# Patient Record
Sex: FEMALE | Race: WHITE | Employment: PART TIME | URBAN - METROPOLITAN AREA
[De-identification: names, ages, dates, MRNs, and addresses within clinical notes are randomized per-mention and may not be internally consistent; named-entity substitution may affect disease eponyms.]

---

## 2019-03-06 ENCOUNTER — HOSPITAL ENCOUNTER (EMERGENCY)
Age: 24
Discharge: HOME | End: 2019-03-06
Attending: EMERGENCY MEDICINE
Payer: COMMERCIAL

## 2019-03-06 VITALS
BODY MASS INDEX: 42.52 KG/M2 | DIASTOLIC BLOOD PRESSURE: 68 MMHG | WEIGHT: 240 LBS | RESPIRATION RATE: 18 BRPM | TEMPERATURE: 98.8 F | HEIGHT: 63 IN | OXYGEN SATURATION: 98 % | SYSTOLIC BLOOD PRESSURE: 117 MMHG | HEART RATE: 88 BPM

## 2019-03-06 DIAGNOSIS — J06.9 VIRAL UPPER RESPIRATORY TRACT INFECTION: Primary | ICD-10-CM

## 2019-03-06 LAB
INFLUENZA A BY PCR: NOT DETECTED
INFLUENZA B BY PCR: NOT DETECTED
STREP GRP A PCR: NEGATIVE

## 2019-03-06 PROCEDURE — 6370000000 HC RX 637 (ALT 250 FOR IP): Performed by: PHYSICIAN ASSISTANT

## 2019-03-06 PROCEDURE — 87880 STREP A ASSAY W/OPTIC: CPT

## 2019-03-06 PROCEDURE — 99283 EMERGENCY DEPT VISIT LOW MDM: CPT

## 2019-03-06 PROCEDURE — 87502 INFLUENZA DNA AMP PROBE: CPT

## 2019-03-06 RX ORDER — ALBUTEROL SULFATE 90 UG/1
2 AEROSOL, METERED RESPIRATORY (INHALATION) EVERY 4 HOURS PRN
Qty: 1 INHALER | Refills: 1 | Status: SHIPPED | OUTPATIENT
Start: 2019-03-06 | End: 2020-06-23 | Stop reason: ALTCHOICE

## 2019-03-06 RX ORDER — BENZONATATE 100 MG/1
100 CAPSULE ORAL 3 TIMES DAILY PRN
Qty: 21 CAPSULE | Refills: 0 | Status: SHIPPED | OUTPATIENT
Start: 2019-03-06 | End: 2019-03-13

## 2019-03-06 RX ORDER — GUAIFENESIN 600 MG/1
600 TABLET, EXTENDED RELEASE ORAL 2 TIMES DAILY
Qty: 30 TABLET | Refills: 0 | Status: SHIPPED | OUTPATIENT
Start: 2019-03-06 | End: 2019-03-21

## 2019-03-06 RX ORDER — IBUPROFEN 600 MG/1
600 TABLET ORAL EVERY 6 HOURS PRN
Qty: 120 TABLET | Refills: 0 | Status: SHIPPED | OUTPATIENT
Start: 2019-03-06 | End: 2020-06-23 | Stop reason: ALTCHOICE

## 2019-03-06 RX ORDER — IBUPROFEN 800 MG/1
800 TABLET ORAL ONCE
Status: COMPLETED | OUTPATIENT
Start: 2019-03-06 | End: 2019-03-06

## 2019-03-06 RX ADMIN — IBUPROFEN 800 MG: 800 TABLET, FILM COATED ORAL at 21:09

## 2019-03-06 ASSESSMENT — PAIN SCALES - GENERAL
PAINLEVEL_OUTOF10: 6
PAINLEVEL_OUTOF10: 5

## 2019-03-06 ASSESSMENT — PAIN DESCRIPTION - DESCRIPTORS: DESCRIPTORS: ACHING

## 2019-03-06 ASSESSMENT — PAIN DESCRIPTION - FREQUENCY: FREQUENCY: CONTINUOUS

## 2019-03-06 ASSESSMENT — PAIN DESCRIPTION - LOCATION: LOCATION: HEAD

## 2020-11-16 ENCOUNTER — HOSPITAL ENCOUNTER (OUTPATIENT)
Age: 25
Discharge: HOME | End: 2020-11-16
Attending: OBSTETRICS & GYNECOLOGY | Admitting: OBSTETRICS & GYNECOLOGY
Payer: COMMERCIAL

## 2020-11-16 VITALS
HEART RATE: 104 BPM | TEMPERATURE: 98 F | RESPIRATION RATE: 16 BRPM | SYSTOLIC BLOOD PRESSURE: 124 MMHG | DIASTOLIC BLOOD PRESSURE: 71 MMHG

## 2020-11-16 PROBLEM — R10.9 ABDOMINAL CRAMPING AFFECTING PREGNANCY: Status: ACTIVE | Noted: 2020-11-16

## 2020-11-16 PROBLEM — O26.899 ABDOMINAL CRAMPING AFFECTING PREGNANCY: Status: ACTIVE | Noted: 2020-11-16

## 2020-11-16 PROCEDURE — 99215 OFFICE O/P EST HI 40 MIN: CPT | Performed by: MIDWIFE

## 2020-11-16 PROCEDURE — 99211 OFF/OP EST MAY X REQ PHY/QHP: CPT

## 2020-11-16 ASSESSMENT — PAIN DESCRIPTION - LOCATION: LOCATION: BACK;PELVIS

## 2020-11-16 ASSESSMENT — PAIN - FUNCTIONAL ASSESSMENT: PAIN_FUNCTIONAL_ASSESSMENT: ACTIVITIES ARE NOT PREVENTED

## 2020-11-16 ASSESSMENT — PAIN DESCRIPTION - PAIN TYPE: TYPE: ACUTE PAIN

## 2020-11-16 ASSESSMENT — PAIN DESCRIPTION - PROGRESSION: CLINICAL_PROGRESSION: GRADUALLY WORSENING

## 2020-11-16 ASSESSMENT — PAIN DESCRIPTION - FREQUENCY: FREQUENCY: INTERMITTENT

## 2020-11-16 ASSESSMENT — PAIN DESCRIPTION - ONSET: ONSET: GRADUAL

## 2020-11-16 ASSESSMENT — PAIN DESCRIPTION - ORIENTATION: ORIENTATION: LOWER

## 2020-11-16 ASSESSMENT — PAIN DESCRIPTION - DESCRIPTORS: DESCRIPTORS: CRAMPING;DISCOMFORT

## 2020-11-16 ASSESSMENT — PAIN SCALES - GENERAL: PAINLEVEL_OUTOF10: 6

## 2020-11-16 NOTE — PROGRESS NOTES
After 1 hour of observing patient and rechecking cervix, pt remains unchanged. Closed/50/-2. Dr. Keri Johnson states to discharge patient home at this time.

## 2020-11-16 NOTE — H&P
Department of Obstetrics and Gynecology  Nurse Practitioner Obstetrics History and Physical        CHIEF COMPLAINT:  cramping    HISTORY OF PRESENT ILLNESS:  Lorraine Butler is a 22 y.o. female , Patient's last menstrual period was 2020.,  at 39w3d. Presents to L&D with cramping and lower back pain that intensified around 2pm today. Talking through contraction. Denies bleeding or LOF, reports good FM. States pregnancy uncomplicated, history of 1 previous vaginal birth        OB History        2    Para   1    Term   1            AB        Living   1       SAB        TAB        Ectopic        Molar        Multiple        Live Births   1                Estimated Due Date: Estimated Date of Delivery: 20      Pregnancy complicated by:   Patient Active Problem List   Diagnosis Code    Obesity complicating pregnancy G77.586    Mental disorder affecting pregnancy in second trimester O99.342    Attention deficit hyperactivity disorder (ADHD) F90.9    Anxiety F41.9    Depression F32.9    Drug fetal damage suspected in pregnancy, antepartum O35. 5XX0    Mental disorder complicating pregnancy T41.350    Maternal morbid obesity in third trimester, antepartum ([address] O99.213, E66.01           PAST OB HISTORY  OB History        2    Para   1    Term   1            AB        Living   1       SAB        TAB        Ectopic        Molar        Multiple        Live Births   1                  Past Medical History:          Diagnosis Date    ADD (attention deficit disorder)     Anxiety     Attention deficit hyperactivity disorder (ADHD) 2016    Depression        Past Surgical History:          Procedure Laterality Date    LAPAROSCOPY      NASAL POLYP SURGERY      TONSILLECTOMY AND ADENOIDECTOMY         Social History:    TOBACCO:   reports that she has been smoking. She has a 3.50 pack-year smoking history.  She has never used smokeless tobacco.  ETOH:   reports no history of alcohol use. DRUGS:   reports no history of drug use. Family History:   No family history on file. Medications Prior to Admission:  Medications Prior to Admission: albuterol sulfate  (90 Base) MCG/ACT inhaler, INHALE 2 PUFFS BY MOUTH EVERY 4 TO 6 HOURS AS NEEDED  Prenatal Vit-Fe Fumarate-FA (PRENATAL PLUS) 27-1 MG TABS, Take 1 tablet by mouth daily    Allergies:  Adhesive tape and Clindamycin/lincomycin      REVIEW OF SYSTEMS:          CONSTITUTIONAL :      No fever, no chills   HEENT :                         Headache absent,   visual disturbances absent  CARDIOVASCULAR :    No chest pain, no palpitations, no edema   RESPIRATORY :            No pain, no shortness of breath   GASTROINTESTINAL : No N/V, no D/C,    abdominal pain absent   GENITOURINARY :      Dysuria   absent,   hematuria absent,   urinary frequency absent  MUSCULOSKELETAL:  No myalgia,   back pain absent  NEUROLOGICAL :        No migraine, no seizures. Pertinent positives and negatives addressed in HPI, other systems reviewed and negative      PHYSICAL EXAM:    /71   Pulse 104   Temp 98 °F (36.7 °C) (Oral)   Resp 16   LMP 2020     General appearance:  awake, alert, cooperative, no apparent distress, and appears stated age  Neurologic:  Awake, alert, oriented to name, place and time.   Ambulatory to unit      Lungs:  Respirations easy and unlabored    Abdomen:   soft, gravid, non-tender,   CVA tenderness NA   Fetal position unable to assess due to maternal body habitus   EFW AGA  Fetal heart rate:  Baseline Heart Rate 135   Variability moderate   Accelerations Present   Decelerations absent  Pelvis:  External Genitalia: Lesions absent  SSE:  NA  Cervix:    DILATION:  Closed  EFFACEMENT:  50%  STATION:  -2  POSITION: posterior  CONSISTENCY:  Medium    Contractions:  one  Membranes:  intact      GBS: negative      Impression:  22 y.o.  39w3d cramping and back pain, GBS negative, category I tracing    Discussed with Dr. Deanna Chen in 1 hour, discharge to home if no cervical change and category I tracing        Electronically signed by ANJELIAC Wang CNM on 11/16/2020 at 5:16 PM

## 2020-11-16 NOTE — PROGRESS NOTES
, 39w3d presents to L&D stating that she thinks that she is in labor. Patient states she thinks that she may be having contractions but is unsure because she didn't feel contractions with her previous pregnancy. Pt denies decreased fetal movement,LOF, VB. EFM applied.

## 2020-11-16 NOTE — PROGRESS NOTES
Pt given written and verbal discharge instructions. Patient verbally understands instructions and is leaving ambulatory at this time.

## 2020-11-20 ENCOUNTER — APPOINTMENT (OUTPATIENT)
Dept: LABOR AND DELIVERY | Age: 25
DRG: 560 | End: 2020-11-20
Payer: COMMERCIAL

## 2020-11-20 ENCOUNTER — HOSPITAL ENCOUNTER (INPATIENT)
Age: 25
LOS: 3 days | Discharge: HOME | DRG: 560 | End: 2020-11-23
Attending: OBSTETRICS & GYNECOLOGY | Admitting: OBSTETRICS & GYNECOLOGY
Payer: COMMERCIAL

## 2020-11-20 PROBLEM — Z3A.40 40 WEEKS GESTATION OF PREGNANCY: Status: ACTIVE | Noted: 2020-11-20

## 2020-11-20 LAB
ABO/RH: NORMAL
AMPHETAMINE SCREEN, URINE: NOT DETECTED
ANTIBODY SCREEN: NORMAL
BARBITURATE SCREEN URINE: NOT DETECTED
BENZODIAZEPINE SCREEN, URINE: NOT DETECTED
CANNABINOID SCREEN URINE: NOT DETECTED
COCAINE METABOLITE SCREEN URINE: NOT DETECTED
FENTANYL SCREEN, URINE: NOT DETECTED
HCT VFR BLD CALC: 37 % (ref 34–48)
HEMOGLOBIN: 12.2 G/DL (ref 11.5–15.5)
Lab: NORMAL
MCH RBC QN AUTO: 29.5 PG (ref 26–35)
MCHC RBC AUTO-ENTMCNC: 33 % (ref 32–34.5)
MCV RBC AUTO: 89.6 FL (ref 80–99.9)
METHADONE SCREEN, URINE: NOT DETECTED
OPIATE SCREEN URINE: NOT DETECTED
OXYCODONE URINE: NOT DETECTED
PDW BLD-RTO: 14 FL (ref 11.5–15)
PHENCYCLIDINE SCREEN URINE: NOT DETECTED
PLATELET # BLD: 195 E9/L (ref 130–450)
PMV BLD AUTO: 9.8 FL (ref 7–12)
RBC # BLD: 4.13 E12/L (ref 3.5–5.5)
WBC # BLD: 9.1 E9/L (ref 4.5–11.5)

## 2020-11-20 PROCEDURE — 86900 BLOOD TYPING SEROLOGIC ABO: CPT

## 2020-11-20 PROCEDURE — 36415 COLL VENOUS BLD VENIPUNCTURE: CPT

## 2020-11-20 PROCEDURE — 1220000001 HC SEMI PRIVATE L&D R&B

## 2020-11-20 PROCEDURE — 85027 COMPLETE CBC AUTOMATED: CPT

## 2020-11-20 PROCEDURE — 86901 BLOOD TYPING SEROLOGIC RH(D): CPT

## 2020-11-20 PROCEDURE — 80307 DRUG TEST PRSMV CHEM ANLYZR: CPT

## 2020-11-20 PROCEDURE — 6370000000 HC RX 637 (ALT 250 FOR IP): Performed by: OBSTETRICS & GYNECOLOGY

## 2020-11-20 PROCEDURE — 86850 RBC ANTIBODY SCREEN: CPT

## 2020-11-20 RX ORDER — ONDANSETRON 2 MG/ML
4 INJECTION INTRAMUSCULAR; INTRAVENOUS EVERY 6 HOURS PRN
Status: DISCONTINUED | OUTPATIENT
Start: 2020-11-20 | End: 2020-11-21

## 2020-11-20 RX ORDER — DOCUSATE SODIUM 100 MG/1
100 CAPSULE, LIQUID FILLED ORAL 2 TIMES DAILY
Status: DISCONTINUED | OUTPATIENT
Start: 2020-11-20 | End: 2020-11-21

## 2020-11-20 RX ORDER — SODIUM CHLORIDE, SODIUM LACTATE, POTASSIUM CHLORIDE, CALCIUM CHLORIDE 600; 310; 30; 20 MG/100ML; MG/100ML; MG/100ML; MG/100ML
INJECTION, SOLUTION INTRAVENOUS CONTINUOUS
Status: DISCONTINUED | OUTPATIENT
Start: 2020-11-20 | End: 2020-11-21

## 2020-11-20 RX ORDER — CALCIUM CARBONATE 200(500)MG
1 TABLET,CHEWABLE ORAL DAILY
Status: ON HOLD | COMMUNITY
End: 2020-11-23 | Stop reason: HOSPADM

## 2020-11-20 RX ADMIN — Medication 25 MCG: at 22:44

## 2020-11-21 ENCOUNTER — ANESTHESIA (OUTPATIENT)
Dept: LABOR AND DELIVERY | Age: 25
DRG: 560 | End: 2020-11-21
Payer: COMMERCIAL

## 2020-11-21 ENCOUNTER — ANESTHESIA EVENT (OUTPATIENT)
Dept: LABOR AND DELIVERY | Age: 25
DRG: 560 | End: 2020-11-21
Payer: COMMERCIAL

## 2020-11-21 PROCEDURE — 6360000002 HC RX W HCPCS: Performed by: ANESTHESIOLOGY

## 2020-11-21 PROCEDURE — 51701 INSERT BLADDER CATHETER: CPT

## 2020-11-21 PROCEDURE — 6370000000 HC RX 637 (ALT 250 FOR IP): Performed by: OBSTETRICS & GYNECOLOGY

## 2020-11-21 PROCEDURE — 1220000000 HC SEMI PRIVATE OB R&B

## 2020-11-21 PROCEDURE — 2500000003 HC RX 250 WO HCPCS: Performed by: NURSE ANESTHETIST, CERTIFIED REGISTERED

## 2020-11-21 PROCEDURE — 2500000003 HC RX 250 WO HCPCS: Performed by: ANESTHESIOLOGY

## 2020-11-21 PROCEDURE — 6360000002 HC RX W HCPCS: Performed by: OBSTETRICS & GYNECOLOGY

## 2020-11-21 PROCEDURE — 7200000001 HC VAGINAL DELIVERY

## 2020-11-21 PROCEDURE — 2580000003 HC RX 258: Performed by: OBSTETRICS & GYNECOLOGY

## 2020-11-21 PROCEDURE — 3E0P7VZ INTRODUCTION OF HORMONE INTO FEMALE REPRODUCTIVE, VIA NATURAL OR ARTIFICIAL OPENING: ICD-10-PCS | Performed by: OBSTETRICS & GYNECOLOGY

## 2020-11-21 RX ORDER — NALOXONE HYDROCHLORIDE 0.4 MG/ML
0.4 INJECTION, SOLUTION INTRAMUSCULAR; INTRAVENOUS; SUBCUTANEOUS PRN
Status: DISCONTINUED | OUTPATIENT
Start: 2020-11-21 | End: 2020-11-21

## 2020-11-21 RX ORDER — SODIUM CHLORIDE 0.9 % (FLUSH) 0.9 %
10 SYRINGE (ML) INJECTION PRN
Status: DISCONTINUED | OUTPATIENT
Start: 2020-11-21 | End: 2020-11-23 | Stop reason: HOSPADM

## 2020-11-21 RX ORDER — ACETAMINOPHEN 650 MG
TABLET, EXTENDED RELEASE ORAL
Status: DISCONTINUED
Start: 2020-11-21 | End: 2020-11-21

## 2020-11-21 RX ORDER — SODIUM CHLORIDE 0.9 % (FLUSH) 0.9 %
10 SYRINGE (ML) INJECTION EVERY 12 HOURS SCHEDULED
Status: DISCONTINUED | OUTPATIENT
Start: 2020-11-21 | End: 2020-11-23 | Stop reason: HOSPADM

## 2020-11-21 RX ORDER — SODIUM CHLORIDE, SODIUM LACTATE, POTASSIUM CHLORIDE, CALCIUM CHLORIDE 600; 310; 30; 20 MG/100ML; MG/100ML; MG/100ML; MG/100ML
INJECTION, SOLUTION INTRAVENOUS CONTINUOUS
Status: DISCONTINUED | OUTPATIENT
Start: 2020-11-21 | End: 2020-11-23 | Stop reason: HOSPADM

## 2020-11-21 RX ORDER — NALBUPHINE HCL 10 MG/ML
5 AMPUL (ML) INJECTION EVERY 4 HOURS PRN
Status: DISCONTINUED | OUTPATIENT
Start: 2020-11-21 | End: 2020-11-21 | Stop reason: RX

## 2020-11-21 RX ORDER — MODIFIED LANOLIN
OINTMENT (GRAM) TOPICAL PRN
Status: DISCONTINUED | OUTPATIENT
Start: 2020-11-21 | End: 2020-11-23 | Stop reason: HOSPADM

## 2020-11-21 RX ORDER — IBUPROFEN 800 MG/1
800 TABLET ORAL EVERY 8 HOURS
Status: DISCONTINUED | OUTPATIENT
Start: 2020-11-22 | End: 2020-11-21

## 2020-11-21 RX ORDER — IBUPROFEN 800 MG/1
800 TABLET ORAL EVERY 8 HOURS
Status: DISCONTINUED | OUTPATIENT
Start: 2020-11-21 | End: 2020-11-23 | Stop reason: HOSPADM

## 2020-11-21 RX ORDER — ACETAMINOPHEN 325 MG/1
650 TABLET ORAL EVERY 4 HOURS PRN
Status: DISCONTINUED | OUTPATIENT
Start: 2020-11-21 | End: 2020-11-23 | Stop reason: HOSPADM

## 2020-11-21 RX ORDER — LIDOCAINE HYDROCHLORIDE 10 MG/ML
INJECTION, SOLUTION INFILTRATION; PERINEURAL
Status: DISCONTINUED
Start: 2020-11-21 | End: 2020-11-21 | Stop reason: WASHOUT

## 2020-11-21 RX ORDER — DOCUSATE SODIUM 100 MG/1
100 CAPSULE, LIQUID FILLED ORAL 2 TIMES DAILY
Status: DISCONTINUED | OUTPATIENT
Start: 2020-11-21 | End: 2020-11-23 | Stop reason: HOSPADM

## 2020-11-21 RX ORDER — ONDANSETRON 2 MG/ML
4 INJECTION INTRAMUSCULAR; INTRAVENOUS EVERY 6 HOURS PRN
Status: DISCONTINUED | OUTPATIENT
Start: 2020-11-21 | End: 2020-11-21

## 2020-11-21 RX ADMIN — Medication 15 ML/HR: at 12:48

## 2020-11-21 RX ADMIN — Medication 10 ML: at 12:43

## 2020-11-21 RX ADMIN — ONDANSETRON 4 MG: 2 INJECTION INTRAMUSCULAR; INTRAVENOUS at 14:48

## 2020-11-21 RX ADMIN — SODIUM CHLORIDE, PRESERVATIVE FREE 10 ML: 5 INJECTION INTRAVENOUS at 21:46

## 2020-11-21 RX ADMIN — Medication 1 MILLI-UNITS/MIN: at 12:10

## 2020-11-21 RX ADMIN — SODIUM CHLORIDE, POTASSIUM CHLORIDE, SODIUM LACTATE AND CALCIUM CHLORIDE: 600; 310; 30; 20 INJECTION, SOLUTION INTRAVENOUS at 17:50

## 2020-11-21 RX ADMIN — IBUPROFEN 800 MG: 800 TABLET, FILM COATED ORAL at 21:46

## 2020-11-21 RX ADMIN — Medication 25 MCG: at 02:48

## 2020-11-21 RX ADMIN — BENZOCAINE AND LEVOMENTHOL: 200; 5 SPRAY TOPICAL at 23:36

## 2020-11-21 RX ADMIN — Medication: at 23:36

## 2020-11-21 RX ADMIN — SODIUM CHLORIDE, POTASSIUM CHLORIDE, SODIUM LACTATE AND CALCIUM CHLORIDE: 600; 310; 30; 20 INJECTION, SOLUTION INTRAVENOUS at 14:38

## 2020-11-21 RX ADMIN — SODIUM CHLORIDE, POTASSIUM CHLORIDE, SODIUM LACTATE AND CALCIUM CHLORIDE: 600; 310; 30; 20 INJECTION, SOLUTION INTRAVENOUS at 12:11

## 2020-11-21 RX ADMIN — Medication 25 MCG: at 07:05

## 2020-11-21 RX ADMIN — DOCUSATE SODIUM 100 MG: 100 CAPSULE ORAL at 21:46

## 2020-11-21 ASSESSMENT — LIFESTYLE VARIABLES: SMOKING_STATUS: 1

## 2020-11-21 ASSESSMENT — PAIN SCALES - GENERAL: PAINLEVEL_OUTOF10: 5

## 2020-11-21 NOTE — PROCEDURES
Jumana Soliz is a 22 y.o. female patient. No diagnosis found. Past Medical History:   Diagnosis Date    ADD (attention deficit disorder)     Anxiety     Attention deficit hyperactivity disorder (ADHD) 1/6/2016    Depression      Blood pressure (!) 103/59, pulse 99, temperature 97.8 °F (36.6 °C), temperature source Oral, resp. rate 18, height 5' 3\" (1.6 m), weight 298 lb (135.2 kg), last menstrual period 02/14/2020, unknown if currently breastfeeding. Procedures  Patient delivered a female infant  via spontaneous vaginal under epidural anesthesia over median episiotomy at 18.13  Weighing 7lbs 2 oz Apgars 8-9. Placenta with 3VC. No  lacerations. Shoulder delivered without difficulty. EBL 200cc   Cord gases   Cord blood .       Tabatha Lemus  11/21/2020 6:23 PM     Tabatha Lemus MD  11/21/2020

## 2020-11-21 NOTE — PROGRESS NOTES
Rebecca David called in for update. Updated on previous SVE. To call with update after next dose of Cytotec placed.

## 2020-11-21 NOTE — ANESTHESIA PROCEDURE NOTES
Epidural Block    Patient location during procedure: OB  Reason for block: procedure for pain and labor epidural  Staffing  Anesthesiologist: Artie Malik DO  Resident/CRNA: ANJELICA Garcia - CRNA  Performed: resident/CRNA   Preanesthetic Checklist  Completed: patient identified, site marked, surgical consent, pre-op evaluation, timeout performed, IV checked, risks and benefits discussed, monitors and equipment checked, anesthesia consent given, oxygen available and patient being monitored  Epidural  Patient position: sitting  Prep: ChloraPrep  Patient monitoring: continuous pulse ox and frequent blood pressure checks  Approach: midline  Location: lumbar (1-5)  Injection technique: GINGER air  Provider prep: mask and sterile gloves  Needle  Needle type: Tuohy   Needle gauge: 18 G  Needle length: 3.5 in  Needle insertion depth: 8 cm  Catheter type: end hole  Catheter size: 20 G.   Catheter at skin depth: 15 cm  Test dose: negative  Assessment  Hemodynamics: stable  Attempts: 1

## 2020-11-21 NOTE — PROGRESS NOTES
Dr. Janice Naylor updated: SVE 3/50/-2, cytotec not placed at this time.  Orders received: Oxytocin, AROM, may have epidural.

## 2020-11-21 NOTE — H&P
file    Stress: Not on file   Relationships    Social connections     Talks on phone: Not on file     Gets together: Not on file     Attends Zoroastrian service: Not on file     Active member of club or organization: Not on file     Attends meetings of clubs or organizations: Not on file     Relationship status: Not on file    Intimate partner violence     Fear of current or ex partner: Not on file     Emotionally abused: Not on file     Physically abused: Not on file     Forced sexual activity: Not on file   Other Topics Concern    Not on file   Social History Narrative    Not on file     Family History:   History reviewed. No pertinent family history. Medications Prior to Admission:  Medications Prior to Admission: calcium carbonate (TUMS) 500 MG chewable tablet, Take 1 tablet by mouth daily  albuterol sulfate  (90 Base) MCG/ACT inhaler, INHALE 2 PUFFS BY MOUTH EVERY 4 TO 6 HOURS AS NEEDED  Prenatal Vit-Fe Fumarate-FA (PRENATAL PLUS) 27-1 MG TABS, Take 1 tablet by mouth daily    REVIEW OF SYSTEMS:    CONSTITUTIONAL:  negative  RESPIRATORY:  negative  CARDIOVASCULAR:  negative  GASTROINTESTINAL:  negative  ALLERGIC/IMMUNOLOGIC:  negative  NEUROLOGICAL:  negative  BEHAVIOR/PSYCH:  negative    PHYSICAL EXAM:  Vitals:    11/21/20 1620 11/21/20 1650 11/21/20 1730 11/21/20 1749   BP: (!) 103/55 (!) 102/55  (!) 103/59   Pulse: 94 96  99   Resp:  18     Temp:   97.8 °F (36.6 °C)    TempSrc:   Oral    Weight:       Height:         General appearance:  awake, alert, cooperative, no apparent distress, and appears stated age  Neurologic:  Awake, alert, oriented to name, place and time. Lungs:  No increased work of breathing, good air exchange  Abdomen:  Soft, non tender, gravid, consistent with her gestational age,  Fetal heart rate:  Reassuring.   Pelvis:  Adequate pelvis  Cervix: Closed 50% firm -3  Contraction frequency:  0 minutes    Membranes:  Intact    ASSESSMENT AND PLAN:    Labor: 40wk  Fetus: Reassuring  GBS: No  Other: cytotec induction      Electronically signed by Shaggy Lindsay MD on 11/21/2020 at 6:24 PM

## 2020-11-21 NOTE — PROGRESS NOTES
presents at 40w0d for scheduled IOL. Denies VB/LOF. States +FM. Placed on EFM, call light within reach.

## 2020-11-21 NOTE — ANESTHESIA PRE PROCEDURE
Department of Anesthesiology  Preprocedure Note       Name:  Orlin Fowler   Age:  22 y.o.  :  1995                                          MRN:  [id number]         Date:  2020      Surgeon: Neema Mathews    Procedure: LABOR EPIDURAL    Medications prior to admission:   Prior to Admission medications    Medication Sig Start Date End Date Taking? Authorizing Provider   calcium carbonate (TUMS) 500 MG chewable tablet Take 1 tablet by mouth daily   Yes Historical Provider, MD   albuterol sulfate  (90 Base) MCG/ACT inhaler INHALE 2 PUFFS BY MOUTH EVERY 4 TO 6 HOURS AS NEEDED 10/23/20   Historical Provider, MD   Prenatal Vit-Fe Fumarate-FA (PRENATAL PLUS) 27-1 MG TABS Take 1 tablet by mouth daily 20   Brandon Ji, APRN - CNP       Current medications:    Current Facility-Administered Medications   Medication Dose Route Frequency Provider Last Rate Last Dose    lactated ringers infusion   Intravenous Continuous Alex Mcginnis  mL/hr at 20 0157      docusate sodium (COLACE) capsule 100 mg  100 mg Oral BID Alex Mcginnis MD        [facility]etron [facility]) injection 4 mg  4 mg Intravenous Q6H PRN Alex Mcginnis MD        miSOPROStol (CYTOTEC) pre-split tablet TABS 25 mcg  25 mcg Vaginal Q4H Alex Mcginnis MD   25 mcg at 20 0248    butorphanol (STADOL) injection 2 mg  2 mg Intravenous Q2H PRN Alex Mcginnis MD           Allergies: Allergies   Allergen Reactions    Adhesive Tape Hives    Penicillins     Clindamycin/Lincomycin Rash       Problem List:    Patient Active Problem List   Diagnosis Code    Obesity complicating pregnancy I06.326    Mental disorder affecting pregnancy in second trimester O99.342    Attention deficit hyperactivity disorder (ADHD) F90.9    Anxiety F41.9    Depression F32.9    Drug fetal damage suspected in pregnancy, antepartum O35. 5XX0    Mental disorder complicating pregnancy O73.872    Maternal morbid obesity in third trimester, BILITOT, ALKPHOS, AST, ALT    POC Tests: No results for input(s): POCGLU, POCNA, POCK, POCCL, POCBUN, POCHEMO, POCHCT in the last 72 hours. Coags: No results found for: PROTIME, INR, APTT    HCG (If Applicable):   Lab Results   Component Value Date    PREGTESTUR POSITIVE 05/11/2020        ABGs: No results found for: PHART, PO2ART, HYL8TVJ, WWQ5QMC, BEART, R9ZGTUSQ     Type & Screen (If Applicable):  No results found for: LABABO, LABRH    Drug/Infectious Status (If Applicable):  No results found for: HIV, HEPCAB    COVID-19 Screening (If Applicable): No results found for: COVID19      Anesthesia Evaluation  Patient summary reviewed and Nursing notes reviewed no history of anesthetic complications:   Airway: Mallampati: III  TM distance: >3 FB   Neck ROM: full  Mouth opening: > = 3 FB Dental:          Pulmonary:normal exam  breath sounds clear to auscultation  (+) current smoker          Patient smoked on day of surgery. ROS comment: Smokes 1 PPD x 12 years. Cardiovascular:Negative CV ROS  Exercise tolerance: good (>4 METS),           Rhythm: regular  Rate: normal           Beta Blocker:  Not on Beta Blocker         Neuro/Psych:   (+) psychiatric history: stable without treatmentdepression/anxiety              ROS comment: Hx of bipolar, anxiety, and depression. Attention deficit disorder. GI/Hepatic/Renal:   (+) morbid obesity          Endo/Other:                     Abdominal:           Vascular:                                        Anesthesia Plan      general, spinal and epidural     ASA 2     (Discussed labor options with patient. Questions answered. Patient agrees to epidural when needed.)        Anesthetic plan and risks discussed with patient. Plan discussed with attending.               CBC   Lab Results   Component Value Date    WBC 9.1 11/20/2020    RBC 4.13 11/20/2020    HGB 12.2 11/20/2020    HCT 37.0 11/20/2020    MCV 89.6 11/20/2020    RDW 14.0 11/20/2020     11/20/2020 CMP  No results found for: NA, K, CL, CO2, BUN, CREATININE, GFRAA, AGRATIO, LABGLOM, GLUCOSE, PROT, CALCIUM, BILITOT, ALKPHOS, AST, ALT  BMP  No results found for: NA, K, CL, CO2, BUN, CREATININE, CALCIUM, GFRAA, LABGLOM, GLUCOSE  POCGlucose  No results for input(s): GLUCOSE in the last 72 hours. Coags  No results found for: PROTIME, INR, APTT    HCG (If Applicable)   Lab Results   Component Value Date    PREGTESTUR POSITIVE 05/11/2020        ABGs   No results found for: PHART, PO2ART, BXQ5BOS, SUI6LGG, BEART, K4WQNSGC     Type & Screen (If Applicable)  Lab Results   Component Value Date    ABORH B POS 11/20/2020     Active Problem List with ICD10 Codes  Patient Active Problem List   Diagnosis Code    Obesity complicating pregnancy A55.354    Mental disorder affecting pregnancy in second trimester O99.342    Attention deficit hyperactivity disorder (ADHD) F90.9    Anxiety F41.9    Depression F32.9    Drug fetal damage suspected in pregnancy, antepartum O35. 5XX0    Mental disorder complicating pregnancy A66.459    Maternal morbid obesity in third trimester, antepartum ([facility] [address] O99.213, E66.01    Cramping affecting pregnancy, antepartum O26.899, R10.9    Abdominal cramping affecting pregnancy O26.899, R10.9    40 weeks gestation of pregnancy Z3A.40       ANJELICA Mcpherson CRNA  November 21, 2020  6:52 AM    ANJELICA Mcpherson CRNA   11/21/2020

## 2020-11-22 LAB
HCT VFR BLD CALC: 34 % (ref 34–48)
HEMOGLOBIN: 10.7 G/DL (ref 11.5–15.5)
MCH RBC QN AUTO: 29.4 PG (ref 26–35)
MCHC RBC AUTO-ENTMCNC: 31.5 % (ref 32–34.5)
MCV RBC AUTO: 93.4 FL (ref 80–99.9)
PDW BLD-RTO: 13.9 FL (ref 11.5–15)
PLATELET # BLD: 166 E9/L (ref 130–450)
PMV BLD AUTO: 9.8 FL (ref 7–12)
RBC # BLD: 3.64 E12/L (ref 3.5–5.5)
WBC # BLD: 10.3 E9/L (ref 4.5–11.5)

## 2020-11-22 PROCEDURE — 90471 IMMUNIZATION ADMIN: CPT | Performed by: OBSTETRICS & GYNECOLOGY

## 2020-11-22 PROCEDURE — 90715 TDAP VACCINE 7 YRS/> IM: CPT | Performed by: OBSTETRICS & GYNECOLOGY

## 2020-11-22 PROCEDURE — 6360000002 HC RX W HCPCS: Performed by: OBSTETRICS & GYNECOLOGY

## 2020-11-22 PROCEDURE — 6370000000 HC RX 637 (ALT 250 FOR IP): Performed by: OBSTETRICS & GYNECOLOGY

## 2020-11-22 PROCEDURE — 85027 COMPLETE CBC AUTOMATED: CPT

## 2020-11-22 PROCEDURE — 1220000000 HC SEMI PRIVATE OB R&B

## 2020-11-22 PROCEDURE — 36415 COLL VENOUS BLD VENIPUNCTURE: CPT

## 2020-11-22 RX ADMIN — ACETAMINOPHEN 650 MG: 325 TABLET ORAL at 16:21

## 2020-11-22 RX ADMIN — IBUPROFEN 800 MG: 800 TABLET, FILM COATED ORAL at 14:30

## 2020-11-22 RX ADMIN — ACETAMINOPHEN 650 MG: 325 TABLET ORAL at 20:54

## 2020-11-22 RX ADMIN — TETANUS TOXOID, REDUCED DIPHTHERIA TOXOID AND ACELLULAR PERTUSSIS VACCINE, ADSORBED 0.5 ML: 5; 2.5; 8; 8; 2.5 SUSPENSION INTRAMUSCULAR at 20:55

## 2020-11-22 RX ADMIN — DOCUSATE SODIUM 100 MG: 100 CAPSULE ORAL at 20:46

## 2020-11-22 RX ADMIN — IBUPROFEN 800 MG: 800 TABLET, FILM COATED ORAL at 07:06

## 2020-11-22 RX ADMIN — IBUPROFEN 800 MG: 800 TABLET, FILM COATED ORAL at 22:50

## 2020-11-22 RX ADMIN — BENZOCAINE AND LEVOMENTHOL: 200; 5 SPRAY TOPICAL at 20:46

## 2020-11-22 RX ADMIN — ACETAMINOPHEN 650 MG: 325 TABLET ORAL at 08:26

## 2020-11-22 RX ADMIN — DOCUSATE SODIUM 100 MG: 100 CAPSULE ORAL at 08:26

## 2020-11-22 ASSESSMENT — PAIN DESCRIPTION - LOCATION
LOCATION: ABDOMEN

## 2020-11-22 ASSESSMENT — PAIN DESCRIPTION - ONSET
ONSET: GRADUAL

## 2020-11-22 ASSESSMENT — PAIN - FUNCTIONAL ASSESSMENT
PAIN_FUNCTIONAL_ASSESSMENT: ACTIVITIES ARE NOT PREVENTED

## 2020-11-22 ASSESSMENT — PAIN DESCRIPTION - PROGRESSION
CLINICAL_PROGRESSION: GRADUALLY WORSENING

## 2020-11-22 ASSESSMENT — PAIN DESCRIPTION - PAIN TYPE
TYPE: ACUTE PAIN

## 2020-11-22 ASSESSMENT — PAIN SCALES - GENERAL
PAINLEVEL_OUTOF10: 3
PAINLEVEL_OUTOF10: 2
PAINLEVEL_OUTOF10: 6
PAINLEVEL_OUTOF10: 3
PAINLEVEL_OUTOF10: 4
PAINLEVEL_OUTOF10: 3

## 2020-11-22 ASSESSMENT — PAIN DESCRIPTION - DESCRIPTORS
DESCRIPTORS: CRAMPING;DISCOMFORT;SORE

## 2020-11-22 ASSESSMENT — PAIN DESCRIPTION - FREQUENCY
FREQUENCY: INTERMITTENT

## 2020-11-22 ASSESSMENT — PAIN DESCRIPTION - ORIENTATION
ORIENTATION: LOWER;MID

## 2020-11-22 NOTE — PLAN OF CARE
Problem: Fluid Volume - Imbalance:  Goal: Absence of postpartum hemorrhage signs and symptoms  Description: Absence of postpartum hemorrhage signs and symptoms  Outcome: Met This Shift     Problem: Infection - Risk of, Puerperal Infection:  Goal: Will show no infection signs and symptoms  Description: Will show no infection signs and symptoms  Outcome: Met This Shift     Problem: Mood - Altered:  Goal: Mood stable  Description: Mood stable  Outcome: Met This Shift     Problem: Pain - Acute:  Goal: Pain level will decrease  Description: Pain level will decrease  Outcome: Ongoing

## 2020-11-22 NOTE — PROGRESS NOTES
Hearing screening results were discussed with parent. Questions answered. Brochure given to parent. Advised to monitor developmental milestones and contact physician for any concerns.    Reginold Better

## 2020-11-22 NOTE — FLOWSHEET NOTE
Pt assisted to bathroom x 2 nurses. Pt states she has numbness of her left leg from the knee upwards. Pt able to bear weight on both legs. Pt also c/o slight numbness along her R&L flank areas. Pt voided. Instructed on sherry care and use of Dermoplast spray. Pt back to bed. Pt to continue to call for help to bathroom until full sensation of left leg. Pt instructed on use of lansinoh cream.

## 2020-11-22 NOTE — PROGRESS NOTES
Progress Note    SUBJECTIVE: patient status post vaginal delivery day 1 doing well ,no complaints normal postpartum course    OBJECTIVE:    VITALS:  BP (!) 112/55   Pulse 97   Temp 98.2 °F (36.8 °C) (Oral)   Resp 18   Ht 5' 3\" (1.6 m)   Wt 298 lb (135.2 kg)   LMP 02/14/2020   Breastfeeding Unknown   BMI 52.79 kg/m²   Physical Exam  lung:cta  Heart : regular rythm  Abdomen:soft non tender ,firm uterus ,bs present  Extremities :normal no evidence of DVT  DATA:  CBC:   Lab Results   Component Value Date    WBC 10.3 11/22/2020    RBC 3.64 11/22/2020    HGB 10.7 11/22/2020    HCT 34.0 11/22/2020    MCV 93.4 11/22/2020    MCH 29.4 11/22/2020    MCHC 31.5 11/22/2020    RDW 13.9 11/22/2020     11/22/2020    MPV 9.8 11/22/2020       ASSESSMENT AND PLAN:  Patient Active Problem List   Diagnosis    Obesity complicating pregnancy    Mental disorder affecting pregnancy in second trimester    Attention deficit hyperactivity disorder (ADHD)    Anxiety    Depression    Drug fetal damage suspected in pregnancy, antepartum    Mental disorder complicating pregnancy    Maternal morbid obesity in third trimester, antepartum ([address]    Cramping affecting pregnancy, antepartum    Abdominal cramping affecting pregnancy    40 weeks gestation of pregnancy       Normal post partum care   Anticipate discharge home

## 2020-11-22 NOTE — PROGRESS NOTES
Patient to bathroom via stedy. Able to void moderate amount of urine. Anuradha care provided. Patient transferred to M/b via wheelchair.

## 2020-11-22 NOTE — FLOWSHEET NOTE
Pt admitted to room 312. Oriented to room, phones, call light and bedside educational material. Pt informed of current visitation policy. Pt informed of pain medicine available as needed. Infant safety reviewed. Pt to call for assist to bathroom with first void. Pt states she does not want the flu vaccine. Pt states she will consider taking the Tdap vaccine.

## 2020-11-22 NOTE — ANESTHESIA POSTPROCEDURE EVALUATION
Department of Anesthesiology  Postprocedure Note    Patient: Mandi Mueller  MRN: [id number]  YOB: 1995  Date of evaluation: 11/22/2020  Time:  9:17 AM     Procedure Summary     Date:  11/21/20 Room / Location:      Anesthesia Start:  1230 Anesthesia Stop:  1813    Procedure:  Labor Analgesia Diagnosis:      Scheduled Providers:   Responsible Provider:  Godfrey Cartwright DO    Anesthesia Type:  general, spinal, epidural ASA Status:  2          Anesthesia Type: general, spinal, epidural    Fyae Phase I:      Faye Phase II:      Last vitals: Reviewed and per EMR flowsheets.        Anesthesia Post Evaluation    Patient location during evaluation: bedside  Patient participation: complete - patient participated  Level of consciousness: awake and alert  Pain score: 0  Airway patency: patent  Nausea & Vomiting: no nausea and no vomiting  Complications: no  Cardiovascular status: blood pressure returned to baseline  Respiratory status: acceptable  Hydration status: euvolemic

## 2020-11-23 VITALS
HEART RATE: 91 BPM | RESPIRATION RATE: 18 BRPM | BODY MASS INDEX: 51.91 KG/M2 | TEMPERATURE: 97.6 F | SYSTOLIC BLOOD PRESSURE: 109 MMHG | DIASTOLIC BLOOD PRESSURE: 65 MMHG | WEIGHT: 293 LBS | HEIGHT: 63 IN

## 2020-11-23 PROCEDURE — 6370000000 HC RX 637 (ALT 250 FOR IP): Performed by: OBSTETRICS & GYNECOLOGY

## 2020-11-23 RX ADMIN — ACETAMINOPHEN 650 MG: 325 TABLET ORAL at 01:16

## 2020-11-23 RX ADMIN — ACETAMINOPHEN 650 MG: 325 TABLET ORAL at 12:05

## 2020-11-23 RX ADMIN — DOCUSATE SODIUM 100 MG: 100 CAPSULE ORAL at 08:35

## 2020-11-23 RX ADMIN — IBUPROFEN 800 MG: 800 TABLET, FILM COATED ORAL at 06:57

## 2020-11-23 ASSESSMENT — PAIN SCALES - GENERAL
PAINLEVEL_OUTOF10: 4
PAINLEVEL_OUTOF10: 3
PAINLEVEL_OUTOF10: 3

## 2020-11-23 NOTE — PROGRESS NOTES
Progress Note    SUBJECTIVE: patient status post vaginal delivery day 2 doing well ,no complaints normal postpartum course    OBJECTIVE:    VITALS:  /73   Pulse 98   Temp 99 °F (37.2 °C) (Oral)   Resp 18   Ht 5' 3\" (1.6 m)   Wt 298 lb (135.2 kg)   LMP 02/14/2020   Breastfeeding Unknown   BMI 52.79 kg/m²   Physical Exam  lung:cta  Heart : regular rythm  Abdomen:soft non tender ,firm uterus ,bs present  Extremities :normal no evidence of DVT  DATA:  CBC:   Lab Results   Component Value Date    WBC 10.3 11/22/2020    RBC 3.64 11/22/2020    HGB 10.7 11/22/2020    HCT 34.0 11/22/2020    MCV 93.4 11/22/2020    MCH 29.4 11/22/2020    MCHC 31.5 11/22/2020    RDW 13.9 11/22/2020     11/22/2020    MPV 9.8 11/22/2020       ASSESSMENT AND PLAN:  Patient Active Problem List   Diagnosis    Obesity complicating pregnancy    Mental disorder affecting pregnancy in second trimester    Attention deficit hyperactivity disorder (ADHD)    Anxiety    Depression    Drug fetal damage suspected in pregnancy, antepartum    Mental disorder complicating pregnancy    Maternal morbid obesity in third trimester, antepartum ([address]    Cramping affecting pregnancy, antepartum    Abdominal cramping affecting pregnancy    40 weeks gestation of pregnancy       Normal post partum care   Anticipate discharge home

## 2020-12-22 NOTE — DISCHARGE SUMMARY
Obstetric Discharge Summary    Admitting Diagnosis  IUP term weeks  OB History        2    Para   2    Term   2            AB        Living   2       SAB        TAB        Ectopic        Molar        Multiple   0    Live Births   2                Reasons for Admission on 2020  9:15 PM  40 weeks gestation of pregnancy [Z3A.40]  No comment available  Onset of Labor  Induction of Labor    Prenatal Procedures  None    Intrapartum Procedures                 Spontaneous Vaginal Delivery: See Labor and Delivery Summary       Postpartum Procedures  None    Postpartum/Operative Complications       [place] Data  Information for the patient's :  Kermitt Fothergill Girl Riccardo Aus [[id number]]   female   Birth Weight: 7 lb 2.3 oz (3.24 kg)     Discharge With Mother  Complications: No    Discharge Diagnosis       Discharge Information  Discharge Medication List as of 2020 10:06 AM      CONTINUE these medications which have NOT CHANGED    Details   albuterol sulfate  (90 Base) MCG/ACT inhaler INHALE 2 PUFFS BY MOUTH EVERY 4 TO 6 HOURS AS NEEDEDHistorical Med      Prenatal Vit-Fe Fumarate-FA (PRENATAL PLUS) 27-1 MG TABS Take 1 tablet by mouth daily, Disp-30 tablet, R-11Normal         STOP taking these medications       calcium carbonate (TUMS) 500 MG chewable tablet Comments:   Reason for Stopping:               No discharge procedures on file.     Discharge to: Home  Follow up in 6 weeks   Stable condition      Comments

## 2021-03-31 ENCOUNTER — HOSPITAL ENCOUNTER (OUTPATIENT)
Age: 26
Discharge: HOME | End: 2021-04-02

## 2021-03-31 PROCEDURE — 88302 TISSUE EXAM BY PATHOLOGIST: CPT

## 2021-11-25 NOTE — PROGRESS NOTES
0700 Bedside and Verbal shift change report given to ARGENIS Davis (oncoming nurse) by Sergo Tsai RN (offgoing nurse). Report included the following information SBAR, Kardex, Intake/Output and Recent Results. 0800 Shift assessment completed, see flowsheet. Remains on HFNC. Currently proned. Tylenol, Robitussin, and Codeine given for HA and hacking cough. 1200 Reassessment, no changes. Remains proned. 1600 Reassessment, no changes. Remains proned. 1900 Bedside and Verbal shift change report given to eSrgo Tsai, KATHRINE (oncoming nurse) by Renan Pitts RN (offgoing nurse). Report included the following information SBAR, Kardex, Intake/Output and Recent Results. Dr. Pee Donahue updated: house officer placed internal monitors at  Approximately 1522 d/t questionable VD and difficulty tracing ctx. After internal monitors placed VDs noted with contractions from approximately 8512-2591, patient repositioned to lateral sides with IV fluid bolus then repositioned to high fowlers. VD resolved after patient repositioned to high fowlers. SVE at 1530 4-5/80/-2, Oxytocin at 6 mU/min, ctxs  q 1.5-4.5 minutes at this time. No new orders at this time.

## 2022-03-02 NOTE — PROGRESS NOTES
of viable infant female at 65. Nuchal x1, reduced, camped x2 and cut. apgars 8/9. Infant taken to radiant warmer for stimulation and assessment. No